# Patient Record
Sex: FEMALE | Race: WHITE | Employment: OTHER
[De-identification: names, ages, dates, MRNs, and addresses within clinical notes are randomized per-mention and may not be internally consistent; named-entity substitution may affect disease eponyms.]

---

## 2023-10-31 ENCOUNTER — HOSPITAL ENCOUNTER (OUTPATIENT)
Facility: HOSPITAL | Age: 33
Setting detail: RECURRING SERIES
Discharge: HOME | End: 2023-11-03
Payer: OTHER GOVERNMENT

## 2023-10-31 PROCEDURE — 97110 THERAPEUTIC EXERCISES: CPT

## 2023-10-31 PROCEDURE — 97161 PT EVAL LOW COMPLEX 20 MIN: CPT

## 2023-10-31 NOTE — PROGRESS NOTES
PT DAILY TREATMENT NOTE/LUMBAR EVAL 10-18    Patient Name: Leidy Mosley  Date:10/31/2023  : 1990  [x]  Patient  Verified  Payor: [employer] EAST / Plan: [employer] EAST / Product Type: *No Product type* /    In time:1132  Out time:1216  Total Treatment Time (min): 25  Visit #: 1 of 15      Treatment Area: Other dorsalgia [M54.89]  SUBJECTIVE  Pain Level (0-10 scale): 2-10  [x]constant [x]intermittent []improving []worsening []no change since onset    Any medication changes, allergies to medications, adverse drug reactions, diagnosis change, or new procedure performed?: [x] No    [] Yes (see summary sheet for update)  Subjective functional status/changes:     Patient has c/c of low back pain since 2022 which occurred while 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 Pain has persisted since that time. Patient describes pain as constant ache and tightness central and left paraspinal L2-S1 with intermittent severe sharp pain and spasms in same region. Pain is worse in PM but does also cause trouble sleeping. Denies numbness/tingling. Denies popping/clicking. Aggravating factors: quick motions and especially bending and reaching. Alleviating factors: rest in supported positions, Flexeril. Denies red flags: SOB, chest pain, dizziness/lightheadedness, blurred/double vision, HA, chills/fevers, night sweats, change in bowel/bladder control, abdominal pain, difficulty swallowing, slurred speech, unexplained weight gain/loss, nausea, vomiting. PMHx: unremarkable. Surgical Hx: ACL repair 2009, left pulmonary lobectomy. Social Hx: single, single level home, social alcohol, no tobacco, sedentary office work. PLOF: avid exerciser, weights and running 3 miles 3 times per week. CLOF: unable to perform usual exercise regiment.   Diagnostic Imaging: xrays negative    OBJECTIVE/EXAMINATION    Precautions: none    19 min [x]Eval                  []Re-Eval       25 min Therapeutic Exercise:  [x]
to bend and waist and squat to lift objects from floor to be able to complete ADLs. Status at IE: patient unable to bend or squat to lift light objects from floor without significant difficulty. Current: Same as IE     Patient will improve FOTO (an established functional score where 100 = no disability) score to 65 points to demonstrate improvement in functional status. Status at IE: 52   Current: Same as IE      Frequency / Duration: Patient to be seen 2 times per week for 8 weeks    Patient/ Caregiver education and instruction: Diagnosis, prognosis, self care, activity modification, and exercises     [x]  Plan of care has been reviewed with PTA    Certification Period: NA Karyle Smolder, PT 10/31/2023 1:25 PM  _____________________________________________________________________  I certify that the above Therapy Services are being furnished while the patient is under my care. I agree with the treatment plan and certify that this therapy is necessary.     Physician's Signature:_______________________ Date:_________ TIME:________                              Julius Sherman MD  Insurance: Payor:  EAST / Plan:  EAST / Product Type: *No Product type* /      ** Signature, Date and Time must be completed for valid certification **    [facility] at [address]  Phone: [phone]   Fax: [phone]

## 2023-11-08 ENCOUNTER — APPOINTMENT (OUTPATIENT)
Facility: HOSPITAL | Age: 33
End: 2023-11-08
Payer: OTHER GOVERNMENT

## 2023-11-10 ENCOUNTER — HOSPITAL ENCOUNTER (OUTPATIENT)
Facility: HOSPITAL | Age: 33
Setting detail: RECURRING SERIES
Discharge: HOME | End: 2023-11-13
Payer: OTHER GOVERNMENT

## 2023-11-10 PROCEDURE — 97530 THERAPEUTIC ACTIVITIES: CPT

## 2023-11-10 PROCEDURE — 97112 NEUROMUSCULAR REEDUCATION: CPT

## 2023-11-10 PROCEDURE — 97110 THERAPEUTIC EXERCISES: CPT

## 2023-11-10 NOTE — PROGRESS NOTES
PHYSICAL / OCCUPATIONAL THERAPY - DAILY TREATMENT NOTE (updated )    Patient Name: Aurelia Cohen    Date: 11/10/2023    : 1990  Insurance: Payor:  EAST / Plan:  EAST / Product Type: *No Product type* /      Patient  verified Yes     Visit #   Current / Total 2 15   Time   In / Out 1530 1627   Pain   In / Out 2-3 3   Subjective Functional Status/Changes: \"I have been doing the exercises. The cat position stretch seems to help the most.\"   Changes to:  Meds, Allergies, Med Hx, Sx Hx? If yes, update Summary List no       TREATMENT AREA =  Other dorsalgia [M54.89]    OBJECTIVE    Therapeutic Procedures: Tx Min Billable or 1:1 Min (if diff from Tx Min) Procedure, Rationale, Specifics   27  18565 Therapeutic Exercise (timed):  increase ROM, strength, coordination, balance, and proprioception to improve patient's ability to progress to PLOF and address remaining functional goals. (see flow sheet as applicable)     Details if applicable:       15  52322 Therapeutic Activity (timed):  use of dynamic activities replicating functional movements to increase ROM, strength, coordination, balance, and proprioception in order to improve patient's ability to progress to PLOF and address remaining functional goals. (see flow sheet as applicable)     Details if applicable:     15  11090 Neuromuscular Re-Education (timed):  improve balance, coordination, kinesthetic sense, posture, core stability and proprioception to improve patient's ability to develop conscious control of individual muscles and awareness of position of extremities in order to progress to PLOF and address remaining functional goals.  (see flow sheet as applicable)     Details if applicable:     62  [address] Totals Reminder: bill using total billable min of TIMED therapeutic procedures (example: do not include dry needle or estim unattended, both untimed codes, in totals to left)  8-22 min = 1 unit; 23-37 min = 2 units; 38-52 min = 3 units;

## 2023-11-15 ENCOUNTER — HOSPITAL ENCOUNTER (OUTPATIENT)
Facility: HOSPITAL | Age: 33
Setting detail: RECURRING SERIES
Discharge: HOME | End: 2023-11-18
Payer: OTHER GOVERNMENT

## 2023-11-15 PROCEDURE — 97530 THERAPEUTIC ACTIVITIES: CPT

## 2023-11-15 PROCEDURE — 97112 NEUROMUSCULAR REEDUCATION: CPT

## 2023-11-15 PROCEDURE — 97110 THERAPEUTIC EXERCISES: CPT

## 2023-11-15 NOTE — PROGRESS NOTES
functional strength 3+/5                 Current: Same as IE                    Patient will report pain no greater than 1-2/10 throughout entire day to aid in completion of ADLs. Status at IE: 2-10/10                 Current: Same as IE                    Patent will be able to bend and waist and squat to lift objects from floor to be able to complete ADLs. Status at IE: patient unable to bend or squat to lift light objects from floor without significant difficulty. Current: Same as IE                    Patient will improve FOTO (an established functional score where 100 = no disability) score to 65 points to demonstrate improvement in functional status.                  Status at IE: 52                 Current: Same as IE    PLAN  Yes  Continue plan of care  []  Upgrade activities as tolerated  []  Discharge due to:   []  Other:    Celia Vasquez PT    11/15/2023    8:22 AM    Future Appointments   Date Time Provider [address]  [address]   11/17/2023  3:30 PM RESHMA [facility]TSAL[facility]   11/20/2023  9:30 AM RES[facility]PTSAL[facility]   11/29/2023  8:10 AM RES[facility]MA [facility]PTSALIMA [facility]   12/1/2023  8:50 AM RESHMA [facility]aHPTSALIMA [facility]

## 2023-11-16 ENCOUNTER — TELEPHONE (OUTPATIENT)
Facility: HOSPITAL | Age: 33
End: 2023-11-16

## 2023-11-16 NOTE — TELEPHONE ENCOUNTER
Asked pt if she could come in to 11/17 appt @ 3pm instead of 3:30 and she confirmed that the new time change works for her.

## 2023-11-17 ENCOUNTER — HOSPITAL ENCOUNTER (OUTPATIENT)
Facility: HOSPITAL | Age: 33
Setting detail: RECURRING SERIES
Discharge: HOME | End: 2023-11-20
Payer: OTHER GOVERNMENT

## 2023-11-17 PROCEDURE — 97530 THERAPEUTIC ACTIVITIES: CPT

## 2023-11-17 PROCEDURE — 97112 NEUROMUSCULAR REEDUCATION: CPT

## 2023-11-17 PROCEDURE — 97110 THERAPEUTIC EXERCISES: CPT

## 2023-11-17 NOTE — PROGRESS NOTES
units; 38-52 min = 3 units; 53-67 min = 4 units; 68-82 min = 5 units   Total Total       TOTAL TREATMENT TIME:        52       [x]  Patient Education billed concurrently with other procedures   [x] Review HEP    [] Progressed/Changed HEP, detail:    [] Other detail:       Objective Information/Functional Measures/Assessment    Advanced program with squats and resistive PNF exercises with good tolerance noted. Will continue to advance intensity of exercises and functional activities as tolerated. Patient will continue to benefit from skilled PT / OT services to modify and progress therapeutic interventions, analyze and address functional mobility deficits, analyze and address ROM deficits, analyze and address strength deficits, analyze and address soft tissue restrictions, analyze and cue for proper movement patterns, analyze and modify for postural abnormalities, analyze and address imbalance/dizziness, and instruct in home and community integration to address functional deficits and attain remaining goals. Progress toward goals / Updated goals:  []  See Progress Note/Recertification    Short Term Goals: To be accomplished in 4 weeks:                 Patient will report compliance with HEP 1x/day to aid in rehabilitation program.                 Status at IE: Patient instructed in and provided written copy of initial Home Exercise Program.                 Current: Patient reports good early compliance with initial HEP. In-progress, 11/10/23                    Patient will increase lumbar ROM flexion to fingertips to floor to aid in completion of ADLs. Status at IE: lumbar flexion fingertips 20 inches from floor. Current: lumbar flexion fingertips 17 inches from floor. In-progress, 11/17/23     Long Term Goals: To be accomplished in 8 weeks:                 Patient will increase Jarrett LE strength to 5/5 MMT throughout to aid in completion of ADLs.                  Status at

## 2023-11-20 ENCOUNTER — HOSPITAL ENCOUNTER (OUTPATIENT)
Facility: HOSPITAL | Age: 33
Setting detail: RECURRING SERIES
Discharge: HOME | End: 2023-11-23
Payer: OTHER GOVERNMENT

## 2023-11-20 PROCEDURE — 97112 NEUROMUSCULAR REEDUCATION: CPT

## 2023-11-20 PROCEDURE — 97110 THERAPEUTIC EXERCISES: CPT

## 2023-11-20 PROCEDURE — 97530 THERAPEUTIC ACTIVITIES: CPT

## 2023-11-20 NOTE — PROGRESS NOTES
throughout to aid in completion of ADLs. Status at IE: bilateral LE functional strength 3+/5                 Current: bilateral LE functional strength 4/5    In-progress, 11/20/23                    Patient will report pain no greater than 1-2/10 throughout entire day to aid in completion of ADLs. Status at IE: 2-10/10                 Current: Same as IE                    Patent will be able to bend and waist and squat to lift objects from floor to be able to complete ADLs. Status at IE: patient unable to bend or squat to lift light objects from floor without significant difficulty. Current: Same as IE                    Patient will improve FOTO (an established functional score where 100 = no disability) score to 65 points to demonstrate improvement in functional status.                  Status at IE: 52                 Current: Same as IE    PLAN  Yes  Continue plan of care  []  Upgrade activities as tolerated  []  Discharge due to:   []  Other:    Catana Cordial, PT    11/20/2023    9:49 AM    Future Appointments   Date Time Provider [address]   11/29/2023  8:10 AM Catana Cordial, PT [facility]   12/1/2023  8:50 AM Catana Cordial, PT [facility]   12/6/2023  8:10 AM Catana Cordial, PT [facility]   12/8/2023  3:30 PM Catana Cordial, PT [facility]   12/13/2023  8:10 AM Catana Cordial, PT [facility]   12/15/2023  3:30 PM Catana Cordial, PT [facility]   12/20/2023  8:10 AM Catana Cordial, PT [facility]   12/22/2023  3:30 PM Catana Cordial, PT [facility]   12/27/2023  8:10 AM Catana Cordial, PT [facility]   12/29/2023  3:30 PM Catana Cordial, PT [facility]

## 2023-11-29 ENCOUNTER — HOSPITAL ENCOUNTER (OUTPATIENT)
Facility: HOSPITAL | Age: 33
Setting detail: RECURRING SERIES
Discharge: HOME | End: 2023-12-02
Payer: OTHER GOVERNMENT

## 2023-11-29 PROCEDURE — 97530 THERAPEUTIC ACTIVITIES: CPT

## 2023-11-29 PROCEDURE — 97110 THERAPEUTIC EXERCISES: CPT

## 2023-11-29 PROCEDURE — 97112 NEUROMUSCULAR REEDUCATION: CPT

## 2023-11-29 NOTE — PROGRESS NOTES
PHYSICAL / OCCUPATIONAL THERAPY - DAILY TREATMENT NOTE (updated )    Patient Name: Brooks Nyhan    Date: 2023    : 1990  Insurance: Payor:  EAST / Plan: [employer] EAST / Product Type: *No Product type* /      Patient  verified Yes     Visit #   Current / Total 6 15   Time   In / Out 0810 0917   Pain   In / Out 3 \"Sore\"   Subjective Functional Status/Changes: \"I have been busy over the holiday and haven't had as much time for stretching so my back is just a bit more painful today. \"   Changes to:  Meds, Allergies, Med Hx, Sx Hx? If yes, update Summary List no       TREATMENT AREA =  Other dorsalgia [M54.89]    OBJECTIVE    Therapeutic Procedures: Tx Min Billable or 1:1 Min (if diff from Tx Min) Procedure, Rationale, Specifics   35  49549 Therapeutic Exercise (timed):  increase ROM, strength, coordination, balance, and proprioception to improve patient's ability to progress to PLOF and address remaining functional goals. (see flow sheet as applicable)     Details if applicable:       17  47026 Therapeutic Activity (timed):  use of dynamic activities replicating functional movements to increase ROM, strength, coordination, balance, and proprioception in order to improve patient's ability to progress to PLOF and address remaining functional goals. (see flow sheet as applicable)     Details if applicable:     15  78129 Neuromuscular Re-Education (timed):  improve balance, coordination, kinesthetic sense, posture, core stability and proprioception to improve patient's ability to develop conscious control of individual muscles and awareness of position of extremities in order to progress to PLOF and address remaining functional goals.  (see flow sheet as applicable)     Details if applicable:     79  [address] Totals Reminder: bill using total billable min of TIMED therapeutic procedures (example: do not include dry needle or estim unattended, both untimed codes, in totals to left)  8-22 min = 1

## 2023-12-01 ENCOUNTER — HOSPITAL ENCOUNTER (OUTPATIENT)
Facility: HOSPITAL | Age: 33
Setting detail: RECURRING SERIES
Discharge: HOME | End: 2023-12-04
Payer: OTHER GOVERNMENT

## 2023-12-01 PROCEDURE — 97110 THERAPEUTIC EXERCISES: CPT

## 2023-12-01 PROCEDURE — 97112 NEUROMUSCULAR REEDUCATION: CPT

## 2023-12-01 PROCEDURE — 97530 THERAPEUTIC ACTIVITIES: CPT

## 2023-12-01 NOTE — PROGRESS NOTES
[facility] at [address] [place]: [phone]   Fax: [phone]  Progress Note  Patient name: Lennox Mulberry Start of Care: 10/31/2023   Referral source: Gentry Morgan MD : 1990               Medical Diagnosis: Other dorsalgia [M54.89]      Onset Date:2022               Treatment Diagnosis:  M54.89  OTHER DORSALGIA                                          Prior Hospitalization: see medical history Provider#: [id number]   Medications: Verified on Patient Summary List      ===========================================================================================  Assessment / Summary of Care:  Lennox Mulberry is a 34 y.o.  female who has been well motivated, consistent and diligent with PT and HEP and as a result is making steady progress towards goals. Trunk AROM is increasing, functional strength is improving and pain intensity is diminishing. Patient will continue to benefit from skilled PT / OT services to modify and progress therapeutic interventions, analyze and address functional mobility deficits, analyze and address ROM deficits, analyze and address strength deficits, analyze and address soft tissue restrictions, analyze and cue for proper movement patterns, analyze and modify for postural abnormalities, analyze and address imbalance/dizziness, and instruct in home and community integration to address functional deficits and attain remaining goals.    ===========================================================================================    Plan:Continue therapy per initial plan/protocol at a frequency of  2 x per week for 4 weeks    Progress Towards Goals:   Short Term Goals:  To be accomplished in 4 weeks:                 Patient will report compliance with HEP 1x/day to aid in rehabilitation program.                 Status at : Patient instructed in and provided written copy of initial Home Exercise Program.
Status at IE: bilateral LE functional strength 3+/5                 Current: bilateral LE functional strength 4/5    In-progress, 11/20/23                    Patient will report pain no greater than 1-2/10 throughout entire day to aid in completion of ADLs. Status at IE: 2-10/10                 Current: 1-5/10   In-progress, 11/29/23                    Patent will be able to bend and waist and squat to lift objects from floor to be able to complete ADLs. Status at IE: patient unable to bend or squat to lift light objects from floor without significant difficulty. Current: Patient now able to flex trunk and squat without increased pain but is not yet able to lift weights from floor. In-progress, 12/1/23                    Patient will improve FOTO (an established functional score where 100 = no disability) score to 65 points to demonstrate improvement in functional status.                  Status at IE: 52                 Current: 61   In-progress, 12/1/23    PLAN  Yes  Continue plan of care  []  Upgrade activities as tolerated  []  Discharge due to:   []  Other:    Omero Barriga, PT    12/1/2023    8:52 AM    Future Appointments   Date Time Provider [address]   12/6/2023  8:10 AM Omero Ao, PT [facility]   12/8/2023  2:50 PM Prakash Villarreal, PT [facility]   12/13/2023  8:10 AM Omero Ao, PT [facility]   12/15/2023  3:30 PM Omero Ao, PT [facility]   12/20/2023  8:10 AM Omero Ao, PT [facility]   12/22/2023  3:30 PM Omero Ao, PT [facility]   12/27/2023  8:10 AM Omero Ao, PT [facility]   12/29/2023  3:30 PM Omero Ao, PT [facility]

## 2023-12-06 ENCOUNTER — HOSPITAL ENCOUNTER (OUTPATIENT)
Facility: HOSPITAL | Age: 33
Setting detail: RECURRING SERIES
Discharge: HOME | End: 2023-12-09
Payer: OTHER GOVERNMENT

## 2023-12-06 PROCEDURE — 97112 NEUROMUSCULAR REEDUCATION: CPT

## 2023-12-06 PROCEDURE — 97530 THERAPEUTIC ACTIVITIES: CPT

## 2023-12-06 PROCEDURE — 97110 THERAPEUTIC EXERCISES: CPT

## 2023-12-06 NOTE — PROGRESS NOTES
4 units; 68-82 min = 5 units   Total Total       TOTAL TREATMENT TIME:        59       [x]  Patient Education billed concurrently with other procedures   [x] Review HEP    [] Progressed/Changed HEP, detail:    [] Other detail:       Objective Information/Functional Measures/Assessment    Progressively adding more dynamic functional activities and stabilization exercises to program as patient symptoms resolve. Patient reporting she is feeling more confident in ADLs and is progressing closer to her zaire baseline. Patient will continue to benefit from skilled PT / OT services to modify and progress therapeutic interventions, analyze and address functional mobility deficits, analyze and address ROM deficits, analyze and address strength deficits, analyze and address soft tissue restrictions, analyze and cue for proper movement patterns, analyze and modify for postural abnormalities, analyze and address imbalance/dizziness, and instruct in home and community integration to address functional deficits and attain remaining goals. Progress toward goals / Updated goals:  []  See Progress Note/Recertification    Short Term Goals: To be accomplished in 4 weeks:                 Patient will report compliance with HEP 1x/day to aid in rehabilitation program.                 Status at IE: Patient instructed in and provided written copy of initial Home Exercise Program.                 Current: Patient reports good early compliance with initial HEP. In-progress, 11/10/23                    Patient will increase lumbar ROM flexion to fingertips to floor to aid in completion of ADLs. Status at IE: lumbar flexion fingertips 20 inches from floor. Current: lumbar flexion fingertips 17 inches from floor. In-progress, 11/17/23     Long Term Goals: To be accomplished in 8 weeks:                 Patient will increase Jarrett LE strength to 5/5 MMT throughout to aid in completion of ADLs.

## 2023-12-08 ENCOUNTER — HOSPITAL ENCOUNTER (OUTPATIENT)
Facility: HOSPITAL | Age: 33
Setting detail: RECURRING SERIES
Discharge: HOME | End: 2023-12-11
Payer: OTHER GOVERNMENT

## 2023-12-08 PROCEDURE — 97530 THERAPEUTIC ACTIVITIES: CPT

## 2023-12-08 PROCEDURE — 97112 NEUROMUSCULAR REEDUCATION: CPT

## 2023-12-08 PROCEDURE — 97110 THERAPEUTIC EXERCISES: CPT

## 2023-12-08 NOTE — PROGRESS NOTES
PHYSICAL / OCCUPATIONAL THERAPY - DAILY TREATMENT NOTE (updated )    Patient Name: Juan Francisco Mendez    Date: 2023    : 1990  Insurance: Payor:  EAST / Plan: [employer] EAST / Product Type: *No Product type* /      Patient  verified Yes     Visit #   Current / Total 9 15   Time   In / Out 1501 1613   Pain   In / Out 3 2   Subjective Functional Status/Changes: \"My dog ran into me at while running at full speed and my back has been a bit more sore since then. \"   Changes to:  Meds, Allergies, Med Hx, Sx Hx? If yes, update Summary List no       TREATMENT AREA =  Other dorsalgia [M54.89]    OBJECTIVE    Therapeutic Procedures: Tx Min Billable or 1:1 Min (if diff from Tx Min) Procedure, Rationale, Specifics   42  83283 Therapeutic Exercise (timed):  increase ROM, strength, coordination, balance, and proprioception to improve patient's ability to progress to PLOF and address remaining functional goals. (see flow sheet as applicable)     Details if applicable:       15  81570 Therapeutic Activity (timed):  use of dynamic activities replicating functional movements to increase ROM, strength, coordination, balance, and proprioception in order to improve patient's ability to progress to PLOF and address remaining functional goals. (see flow sheet as applicable)     Details if applicable:     15  34871 Neuromuscular Re-Education (timed):  improve balance, coordination, kinesthetic sense, posture, core stability and proprioception to improve patient's ability to develop conscious control of individual muscles and awareness of position of extremities in order to progress to PLOF and address remaining functional goals.  (see flow sheet as applicable)     Details if applicable:     67  [facility] Totals Reminder: bill using total billable min of TIMED therapeutic procedures (example: do not include dry needle or estim unattended, both untimed codes, in totals to left)  8-22 min = 1 unit; 23-37 min = 2 units; 38-52

## 2023-12-13 ENCOUNTER — APPOINTMENT (OUTPATIENT)
Facility: HOSPITAL | Age: 33
End: 2023-12-13
Payer: OTHER GOVERNMENT

## 2023-12-15 ENCOUNTER — APPOINTMENT (OUTPATIENT)
Facility: HOSPITAL | Age: 33
End: 2023-12-15
Payer: OTHER GOVERNMENT

## 2023-12-22 ENCOUNTER — APPOINTMENT (OUTPATIENT)
Facility: HOSPITAL | Age: 33
End: 2023-12-22
Payer: OTHER GOVERNMENT

## 2023-12-27 ENCOUNTER — HOSPITAL ENCOUNTER (OUTPATIENT)
Facility: HOSPITAL | Age: 33
Setting detail: RECURRING SERIES
Discharge: HOME | End: 2023-12-30
Payer: OTHER GOVERNMENT

## 2023-12-27 PROCEDURE — 97112 NEUROMUSCULAR REEDUCATION: CPT

## 2023-12-27 PROCEDURE — 97110 THERAPEUTIC EXERCISES: CPT

## 2023-12-27 PROCEDURE — 97530 THERAPEUTIC ACTIVITIES: CPT

## 2023-12-27 NOTE — PROGRESS NOTES
PHYSICAL / OCCUPATIONAL THERAPY - DAILY TREATMENT NOTE (updated )    Patient Name: Adryan So    Date: 2023    : 1990  Insurance: Payor:  EAST / Plan:  EAST / Product Type: *No Product type* /      Patient  verified Yes     Visit #   Current / Total 11 15   Time   In / Out 0810 0913   Pain   In / Out 0 0   Subjective Functional Status/Changes: \"I did have a flare up of back pain after pulling my big dog out of a dog fight. But, I did a lot of the PT exercises afterwards and the pain went away. \"   Changes to:  Meds, Allergies, Med Hx, Sx Hx? If yes, update Summary List no       TREATMENT AREA =  Other dorsalgia [M54.89]    OBJECTIVE    Therapeutic Procedures: Tx Min Billable or 1:1 Min (if diff from Tx Min) Procedure, Rationale, Specifics   33  73418 Therapeutic Exercise (timed):  increase ROM, strength, coordination, balance, and proprioception to improve patient's ability to progress to PLOF and address remaining functional goals. (see flow sheet as applicable)     Details if applicable:       15  10709 Therapeutic Activity (timed):  use of dynamic activities replicating functional movements to increase ROM, strength, coordination, balance, and proprioception in order to improve patient's ability to progress to PLOF and address remaining functional goals. (see flow sheet as applicable)     Details if applicable:     15  57200 Neuromuscular Re-Education (timed):  improve balance, coordination, kinesthetic sense, posture, core stability and proprioception to improve patient's ability to develop conscious control of individual muscles and awareness of position of extremities in order to progress to PLOF and address remaining functional goals.  (see flow sheet as applicable)     Details if applicable:     61  [facility] Totals Reminder: bill using total billable min of TIMED therapeutic procedures (example: do not include dry needle or estim unattended, both untimed codes, in totals

## 2023-12-29 ENCOUNTER — HOSPITAL ENCOUNTER (OUTPATIENT)
Facility: HOSPITAL | Age: 33
Setting detail: RECURRING SERIES
Discharge: HOME | End: 2024-01-01
Payer: OTHER GOVERNMENT

## 2023-12-29 PROCEDURE — 97530 THERAPEUTIC ACTIVITIES: CPT

## 2023-12-29 PROCEDURE — 97112 NEUROMUSCULAR REEDUCATION: CPT

## 2023-12-29 PROCEDURE — 97110 THERAPEUTIC EXERCISES: CPT

## 2024-01-03 ENCOUNTER — HOSPITAL ENCOUNTER (OUTPATIENT)
Facility: HOSPITAL | Age: 34
Setting detail: RECURRING SERIES
Discharge: HOME | End: 2024-01-06
Payer: OTHER GOVERNMENT

## 2024-01-03 PROCEDURE — 97112 NEUROMUSCULAR REEDUCATION: CPT

## 2024-01-03 PROCEDURE — 97530 THERAPEUTIC ACTIVITIES: CPT

## 2024-01-03 PROCEDURE — 97110 THERAPEUTIC EXERCISES: CPT

## 2024-01-03 NOTE — PROGRESS NOTES
[facility] at [facility]  [address] [address]                                                                                      Phone: [phone]   Fax: [phone]    Progress Note  Patient name: Sharon Urias Start of Care: 10/31/2023   Referral source: Renetta Morris MD : 1990               Medical Diagnosis: Other dorsalgia [M54.89]      Onset Date:2022               Treatment Diagnosis:  M54.89  OTHER DORSALGIA                                          Prior Hospitalization: see medical history Provider#: [id number]   Medications: Verified on Patient Summary List      Comorbidities: none  Prior Level of Function: avid exerciser, weights and running 3 miles 3 times per week              Visits from Start of Care: 13    Missed Visits: 1    Updated Goals/Measure of Progress: To be achieved in 1-2 weeks:    Short Term Goals: To be accomplished in 4 weeks:                 Patient will report compliance with HEP 1x/day to aid in rehabilitation program.                 Status at IE: Patient instructed in and provided written copy of initial Home Exercise Program.                 Current: Patient nearing independence in comprehensive HEP.      In-progress, 1/3/2024                    Patient will increase lumbar ROM flexion to fingertips to floor to aid in completion of ADLs.                 Status at IE: lumbar flexion fingertips 20 inches from floor.                 Current: lumbar flexion fingertips to floor.    MET, 1/3/2024     Long Term Goals: To be accomplished in 8 weeks:                 Patient will increase Jarrett LE strength to 5/5 MMT throughout to aid in completion of ADLs.                 Status at IE: bilateral LE functional strength 3+/5                 Current: bilateral LE functional strength 4+/5    In-progress, 1/3/2024                    Patient will report pain no greater than 1-2/10 throughout entire day to aid in completion of ADLs.             
strength to 5/5 MMT throughout to aid in completion of ADLs.                 Status at IE: bilateral LE functional strength 3+/5                 Current: bilateral LE functional strength 4+/5    In-progress, 1/3/2024                    Patient will report pain no greater than 1-2/10 throughout entire day to aid in completion of ADLs.                 Status at IE: 2-10/10                 Current: 0-3/10   In-progress, 1/3/2024                    Patent will be able to bend and waist and squat to lift objects from floor to be able to complete ADLs.                 Status at IE: patient unable to bend or squat to lift light objects from floor without significant difficulty.                 Current: Patient now able to flex trunk and squat without increased pain but is not yet able to lift weights from floor.   In-progress, 12/1/23                    Patient will improve FOTO (an established functional score where 100 = no disability) score to 65 points to demonstrate improvement in functional status.                 Status at IE: 49                 Current: 59        In-progress, 12/1/23    PLAN  Yes  Continue plan of care  []  Upgrade activities as tolerated  []  Discharge due to:   []  Other:    Bhavik Archibald PT    1/3/2024    8:17 AM    Future Appointments   Date Time Provider Department Center   1/5/2024  8:50 AM Bhavik Archibald PT [facility]   1/10/2024  7:30 AM Bhavik Archibald PT [facility]

## 2024-01-05 ENCOUNTER — HOSPITAL ENCOUNTER (OUTPATIENT)
Facility: HOSPITAL | Age: 34
Setting detail: RECURRING SERIES
Discharge: HOME | End: 2024-01-08
Payer: OTHER GOVERNMENT

## 2024-01-05 PROCEDURE — 97112 NEUROMUSCULAR REEDUCATION: CPT

## 2024-01-05 PROCEDURE — 97110 THERAPEUTIC EXERCISES: CPT

## 2024-01-05 PROCEDURE — 97530 THERAPEUTIC ACTIVITIES: CPT

## 2024-01-05 NOTE — PROGRESS NOTES
PHYSICAL / OCCUPATIONAL THERAPY - DAILY TREATMENT NOTE (updated )    Patient Name: Flor Urias    Date: 2024    : 1990  Insurance: Payor: [employer] / Plan: [employer] / Product Type: *No Product type* /      Patient  verified Yes     Visit #   Current / Total 14 15   Time   In / Out 0851 0959   Pain   In / Out 0 0   Subjective Functional Status/Changes: \"I am back to running and my low back is feeling alright.\"   Changes to:  Meds, Allergies, Med Hx, Sx Hx?  If yes, update Summary List no       TREATMENT AREA =  Other dorsalgia [M54.89]    OBJECTIVE    Therapeutic Procedures:  Tx Min Billable or 1:1 Min (if diff from Tx Min) Procedure, Rationale, Specifics   39  37096 Therapeutic Exercise (timed):  increase ROM, strength, coordination, balance, and proprioception to improve patient's ability to progress to PLOF and address remaining functional goals. (see flow sheet as applicable)     Details if applicable:       15  39215 Therapeutic Activity (timed):  use of dynamic activities replicating functional movements to increase ROM, strength, coordination, balance, and proprioception in order to improve patient's ability to progress to PLOF and address remaining functional goals.  (see flow sheet as applicable)     Details if applicable:     15  70370 Neuromuscular Re-Education (timed):  improve balance, coordination, kinesthetic sense, posture, core stability and proprioception to improve patient's ability to develop conscious control of individual muscles and awareness of position of extremities in order to progress to PLOF and address remaining functional goals. (see flow sheet as applicable)     Details if applicable:     69  [facility] Totals Reminder: bill using total billable min of TIMED therapeutic procedures (example: do not include dry needle or estim unattended, both untimed codes, in totals to left)  8-22 min = 1 unit; 23-37 min = 2 units; 38-52 min = 3 units; 53-67 min = 4 units; 68-82

## 2024-01-10 ENCOUNTER — HOSPITAL ENCOUNTER (OUTPATIENT)
Facility: HOSPITAL | Age: 34
Setting detail: RECURRING SERIES
Discharge: HOME | End: 2024-01-13
Payer: OTHER GOVERNMENT

## 2024-01-10 PROCEDURE — 97530 THERAPEUTIC ACTIVITIES: CPT

## 2024-01-10 PROCEDURE — 97110 THERAPEUTIC EXERCISES: CPT

## 2024-01-10 PROCEDURE — 97112 NEUROMUSCULAR REEDUCATION: CPT

## 2024-01-10 NOTE — PROGRESS NOTES
[facility] at [facility]  [address] [address]  Phone: [phone]   Fax: [phone]    Discharge Summary    Patient name: Sharon Urias Start of Care: 10/31/2023   Referral source: Renetta Morris MD : 1990               Medical Diagnosis: Other dorsalgia [M54.89]      Onset Date:2022               Treatment Diagnosis:  M54.89  OTHER DORSALGIA                                          Prior Hospitalization: see medical history Provider#: [id number]   Medications: Verified on Patient Summary List      Comorbidities: none  Prior Level of Function: avid exerciser, weights and running 3 miles 3 times per week                   Visits from Start of Care: 15    Missed Visits: 0    Reporting Period : 10/31/2023 to 1/10/2024    Goals/Measure of Progress:  Short Term Goals: To be accomplished in 4 weeks:                 Patient will report compliance with HEP 1x/day to aid in rehabilitation program.                 Status at IE: Patient instructed in and provided written copy of initial Home Exercise Program.                 Current: Patient now fully independent in comprehensive HEP.    MET, 1/10/2024                    Patient will increase lumbar ROM flexion to fingertips to floor to aid in completion of ADLs.                 Status at IE: lumbar flexion fingertips 20 inches from floor.                 Current: lumbar flexion fingertips to floor.    MET, 1/3/2024     Long Term Goals: To be accomplished in 8 weeks:                 Patient will increase Jarrett LE strength to 5/5 MMT throughout to aid in completion of ADLs.                 Status at IE: bilateral LE functional strength 3+/5                 Current: bilateral LE functional strength 5/5       MET, 1/10/2024                    Patient will report pain no greater than 1-2/10 throughout entire day to aid in completion of ADLs.                 Status at IE: 2-10/10                 Current: 0-2/10    MET, 1/10/2024

## 2024-01-10 NOTE — PROGRESS NOTES
Physical Therapy Discharge Instructions    [facility] at [facility] [place]  [address] [address]  Phone: [phone]   Fax: [phone]      Patient: Flor Urias  : 1990    Continue Home Exercise Program 1 times per day for 6 weeks, then decrease to 4 times per week      Continue with    [x] Ice  as needed      [x] Heat           Follow up with MD:     [] Upon completion of therapy     [x] As needed      Recommendations:     [x]   Return to activity with home program    []   Return to activity with the following modifications:       []Post Rehab Program    []Join Independent aquatic program     []Return to/join local gym      Additional Comments: Please contact clinic at above phone number if you have any questions regarding Home Exercise Program or self care instructions.

## 2024-01-10 NOTE — PROGRESS NOTES
PHYSICAL / OCCUPATIONAL THERAPY - DAILY TREATMENT NOTE (updated )    Patient Name: Flor Urias    Date: 1/10/2024    : 1990  Insurance: Payor: [employer] / Plan: [employer] / Product Type: *No Product type* /      Patient  verified Yes     Visit #   Current / Total 15 15   Time   In / Out 0733 0831   Pain   In / Out 0 0   Subjective Functional Status/Changes: \"I am feeling real good. My only concern is that I still have some fear that the pain might come back if I do something wrong.\"   Changes to:  Meds, Allergies, Med Hx, Sx Hx?  If yes, update Summary List no       TREATMENT AREA =  Other dorsalgia [M54.89]    OBJECTIVE    Therapeutic Procedures:  Tx Min Billable or 1:1 Min (if diff from Tx Min) Procedure, Rationale, Specifics   28  00357 Therapeutic Exercise (timed):  increase ROM, strength, coordination, balance, and proprioception to improve patient's ability to progress to PLOF and address remaining functional goals. (see flow sheet as applicable)     Details if applicable:       15  71299 Therapeutic Activity (timed):  use of dynamic activities replicating functional movements to increase ROM, strength, coordination, balance, and proprioception in order to improve patient's ability to progress to PLOF and address remaining functional goals.  (see flow sheet as applicable)     Details if applicable:     15  05472 Neuromuscular Re-Education (timed):  improve balance, coordination, kinesthetic sense, posture, core stability and proprioception to improve patient's ability to develop conscious control of individual muscles and awareness of position of extremities in order to progress to PLOF and address remaining functional goals. (see flow sheet as applicable)     Details if applicable:     58  [facility] Totals Reminder: bill using total billable min of TIMED therapeutic procedures (example: do not include dry needle or estim unattended, both untimed codes, in totals to left)  8-22 min = 1 unit;